# Patient Record
Sex: FEMALE | Race: WHITE
[De-identification: names, ages, dates, MRNs, and addresses within clinical notes are randomized per-mention and may not be internally consistent; named-entity substitution may affect disease eponyms.]

---

## 2017-05-24 ENCOUNTER — HOSPITAL ENCOUNTER (INPATIENT)
Dept: HOSPITAL 80 - F3N | Age: 72
LOS: 2 days | Discharge: HOME HEALTH | DRG: 470 | End: 2017-05-26
Attending: ORTHOPAEDIC SURGERY | Admitting: ORTHOPAEDIC SURGERY
Payer: COMMERCIAL

## 2017-05-24 DIAGNOSIS — E78.5: ICD-10-CM

## 2017-05-24 DIAGNOSIS — E11.9: ICD-10-CM

## 2017-05-24 DIAGNOSIS — M17.12: Primary | ICD-10-CM

## 2017-05-24 DIAGNOSIS — E03.9: ICD-10-CM

## 2017-05-24 DIAGNOSIS — I10: ICD-10-CM

## 2017-05-24 PROCEDURE — C1713 ANCHOR/SCREW BN/BN,TIS/BN: HCPCS

## 2017-05-24 PROCEDURE — 0SRD0J9 REPLACEMENT OF LEFT KNEE JOINT WITH SYNTHETIC SUBSTITUTE, CEMENTED, OPEN APPROACH: ICD-10-PCS | Performed by: ORTHOPAEDIC SURGERY

## 2017-05-24 RX ADMIN — FAMOTIDINE SCH MG: 20 TABLET, FILM COATED ORAL at 19:58

## 2017-05-24 RX ADMIN — ACETAMINOPHEN SCH MG: 325 TABLET ORAL at 17:29

## 2017-05-24 RX ADMIN — PREGABALIN SCH MG: 75 CAPSULE ORAL at 19:58

## 2017-05-24 RX ADMIN — ZOLPIDEM TARTRATE SCH MG: 5 TABLET ORAL at 21:33

## 2017-05-24 RX ADMIN — Medication SCH MLS: at 17:27

## 2017-05-24 RX ADMIN — DOCUSATE SODIUM AND SENNOSIDES SCH TAB: 50; 8.6 TABLET ORAL at 19:58

## 2017-05-24 RX ADMIN — OXYCODONE HYDROCHLORIDE PRN MG: 15 TABLET ORAL at 19:58

## 2017-05-24 RX ADMIN — WARFARIN SODIUM SCH MG: 5 TABLET ORAL at 17:26

## 2017-05-24 RX ADMIN — CYCLOBENZAPRINE HYDROCHLORIDE PRN MG: 10 TABLET, FILM COATED ORAL at 15:10

## 2017-05-24 NOTE — POSTOPPROG
Post Op Note


Date of Operation: 05/24/17


Surgeon: CHRIS Ch


Assistant: maria elena ch


Anesthesiologist: dr. modi


Anesthesia: Spinal, Other (Specify) (adductor canal block)


Pre-op Diagnosis: leftk nee OA


Post-op Diagnosis: same


Indication: left knee pain due to OA that failed conservative measures


Procedure: L TKA 


Findings: severe knee OA


Inf/Abcess present in the surg proc area at time of surgery?: No


EBL:

## 2017-05-25 VITALS — RESPIRATION RATE: 16 BRPM

## 2017-05-25 LAB
HCT VFR BLD CALC: 38.8 % (ref 38–47)
HGB BLD-MCNC: 12.5 G/DL (ref 12.6–16.3)
INR PPP: 1.07 (ref 0.83–1.16)
PROTHROMBIN TIME: 13.8 SEC (ref 12–15)

## 2017-05-25 RX ADMIN — ACETAMINOPHEN SCH MG: 325 TABLET ORAL at 23:01

## 2017-05-25 RX ADMIN — PREGABALIN SCH MG: 75 CAPSULE ORAL at 07:36

## 2017-05-25 RX ADMIN — ACETAMINOPHEN SCH MG: 325 TABLET ORAL at 00:46

## 2017-05-25 RX ADMIN — CYCLOBENZAPRINE HYDROCHLORIDE PRN MG: 10 TABLET, FILM COATED ORAL at 19:27

## 2017-05-25 RX ADMIN — OXYCODONE HYDROCHLORIDE PRN MG: 15 TABLET ORAL at 15:54

## 2017-05-25 RX ADMIN — FAMOTIDINE SCH MG: 20 TABLET, FILM COATED ORAL at 20:08

## 2017-05-25 RX ADMIN — PREGABALIN SCH MG: 75 CAPSULE ORAL at 18:20

## 2017-05-25 RX ADMIN — FAMOTIDINE SCH MG: 20 TABLET, FILM COATED ORAL at 07:35

## 2017-05-25 RX ADMIN — ZOLPIDEM TARTRATE SCH MG: 5 TABLET ORAL at 22:25

## 2017-05-25 RX ADMIN — OXYCODONE HYDROCHLORIDE PRN MG: 15 TABLET ORAL at 20:09

## 2017-05-25 RX ADMIN — Medication SCH MLS: at 00:46

## 2017-05-25 RX ADMIN — ACETAMINOPHEN SCH MG: 325 TABLET ORAL at 11:53

## 2017-05-25 RX ADMIN — CYCLOBENZAPRINE HYDROCHLORIDE PRN MG: 10 TABLET, FILM COATED ORAL at 00:47

## 2017-05-25 RX ADMIN — ACETAMINOPHEN SCH MG: 325 TABLET ORAL at 05:28

## 2017-05-25 RX ADMIN — OXYCODONE HYDROCHLORIDE PRN MG: 15 TABLET ORAL at 05:29

## 2017-05-25 RX ADMIN — ENOXAPARIN SODIUM SCH MG: 100 INJECTION SUBCUTANEOUS at 07:36

## 2017-05-25 RX ADMIN — OXYCODONE HYDROCHLORIDE PRN MG: 15 TABLET ORAL at 11:53

## 2017-05-25 RX ADMIN — WARFARIN SODIUM SCH MG: 5 TABLET ORAL at 15:54

## 2017-05-25 RX ADMIN — DOCUSATE SODIUM AND SENNOSIDES SCH TAB: 50; 8.6 TABLET ORAL at 20:08

## 2017-05-25 RX ADMIN — ACETAMINOPHEN SCH MG: 325 TABLET ORAL at 18:17

## 2017-05-25 RX ADMIN — DOCUSATE SODIUM AND SENNOSIDES SCH TAB: 50; 8.6 TABLET ORAL at 07:34

## 2017-05-25 RX ADMIN — LEVOTHYROXINE SODIUM SCH MCG: 0.09 TABLET ORAL at 05:28

## 2017-05-25 NOTE — GDS
[f rep st]



                                                             DISCHARGE SUMMARY





ADMISSION DIAGNOSIS:  Left knee osteoarthritis.



DISCHARGE DIAGNOSIS:  Left knee osteoarthritis.



PROCEDURE:  Left total knee arthroplasty.



VTE PROPHYLAXIS:  Coumadin and Lovenox recommended.



BRIEF DESCRIPTION OF HOSPITAL STAY:  Patient was admitted for an elective joint arthroplasty.  The p
atient tolerated the procedure well and has passed physical therapy.  The patient was given appropri
ate antibiotic prophylaxis and venous thromboembolism prophylaxis.  The patient's pain was well cont
rolled on oral pain medication, patient was holding down food, and had urinated.  Decision was made 
to discharge the patient.  The patient was given post-operative prescriptions pre-operatively.



PLAN:  Please follow up as scheduled, June 8, 2017, at 11:00 a.m.





Job #:  [id number]

## 2017-05-25 NOTE — SOAPPROG
SOAP Progress Note


Assessment/Plan: 


Assessment:








Patient is doing well POD 1 s/p L TKA


Pain management: pain is well controlled on oral pain meds.


VTE ppx: recommend coumadin and lovenox, cont JASPER and SCDs


Anemia: level is expected initially postop. Asymptomatic. Continue to monitor 


D/c planning: d/c to home tomorrow pending release from PT with support of HH 

RN and PT for blood draws 

















Plan:





05/25/17 13:30





Subjective: 





Niki is doing well today, denies SOB, chest pain and N/V.  pleased with 

recovery so far


Objective: 





 Vital Signs











Temp Pulse Resp BP Pulse Ox


 


 36.5 C   64   16   111/71   94 


 


 05/25/17 13:09  05/25/17 13:09  05/25/17 13:09  05/25/17 13:09  05/25/17 13:09








 Laboratory Results





 05/25/17 04:25 





 











 05/24/17 05/25/17 05/26/17





 05:59 05:59 05:59


 


Intake Total  2650 100


 


Output Total  1430 750


 


Balance  1220 -650








 











PT  13.8 SEC (12.0-15.0)   05/25/17  04:25    


 


INR  1.07  (0.83-1.16)   05/25/17  04:25    








LLE: incision dressing is clean and dry, NVI, +pf/df





ICD10 Worksheet


Patient Problems: 


 Problems











Problem Status Onset


 


Primary localized osteoarthritis of left knee Acute

## 2017-05-25 NOTE — GOP
[f 
rep st]



                                                                OPERATIVE REPORT





DATE OF OPERATION:  5/24/17



SURGEON:  BREEZY Mcdaniel MD



ASSISTANT:  CINTHIA Irby



ANESTHESIA:  Spinal.



PREOPERATIVE DIAGNOSIS:  Left knee osteoarthritis.



POSTOPERATIVE DIAGNOSIS:  Left knee osteoarthritis.



PROCEDURE PERFORMED:  Left total knee arthroplasty.



FINDINGS:  



ESTIMATED BLOOD LOSS:  30 cc.



INDICATIONS:  This is a 71-year-old female with severe and progressive pain and 
deformity of the left knee unresponsive to conservative care.  Risks and 
benefits of the surgical intervention were explained in detail.



DESCRIPTION OF PROCEDURE:  The patient was brought to the operative room and 
placed on the table in the supine position.  Spinal anesthesia was induced 
without difficulty.  A pneumatic tourniquet was applied about the left proximal 
thigh, and the leg was prepped and draped in a sterile fashion.  The leg guzman 
was applied.  After exsanguination by elevation the tourniquet was inflated to 
275 mm of mercury. 



Incision was made anterior medial from the tibial tuberosity to a point 2 cm 
proximal to the superior pole of the patella.  Medial parapatellar arthrotomy 
was carried out from the superior pole of the patella and posteriorly in line 
with the fibers of the Type 2 VMO.  Pathology  severe medial patellofemoral 
osteoarthritis.  The medial collateral ligament was elevated and the 
infrapatellar fat pad was resected. 



The patella was everted and the articular surface was excised.  A 35 mm 
patellar button was placed. The distal femoral guide hole was drilled and the 6-
degree alignment geena was placed.  An 8 mm distal femoral cut was made without 
difficulty. 



Attention was turned to the tibia and a standard 9 mm cut based on the lateral 
tibial condyle was performed.  The tibial articular surface was excised without 
difficulty. 



Attention was turned back to the femur and a size 4 Triathlon femoral cutting 
block was positioned.  Anterior, posterior, and chamfer cuts were made, 
followed by the intercondylar box cut. 



The knee was extended and the remnants of the medial and lateral meniscus were 
excised.  The posterior capsule was injected with ropivacaine, epinephrine and 
Toradol.  A size 3 MIS mini-keel tibial tray was positioned.  Trial reduction 
was then carried out.  There was excellent range of motion, alignment, and 
stability using the 9 mm polyethylene. 



All trials were then removed.  The joint was thoroughly irrigated and carefully 
dried.  Two packages of cement and 2 grams of vancomycin were mixed in the 
vacuum mixer and placed on the fixation surfaces of all surfaces of the 
components.  The components were implanted and all excess cement was thoroughly 
removed.  The permanent 9 mm polyethylene was placed without difficulty.  



The tourniquet was deflated and all bleeders were coagulated.  The wound was 
thoroughly irrigated and closed using interrupted sutures of 2-0 Vicryl for the 
joint capsule.  The subcu was closed with 3-0 Vicryl and the skin with 4-0 
Monocryl.  Dermabond and Steri-Strips were applied followed by a compressive 
dressing.  The patient was then moved from the operating room to the recovery 
room in good condition, having tolerated the procedure well.





Job #:  [id number]

MTDD

## 2017-05-26 VITALS — TEMPERATURE: 97.8 F | SYSTOLIC BLOOD PRESSURE: 113 MMHG | DIASTOLIC BLOOD PRESSURE: 81 MMHG | HEART RATE: 73 BPM

## 2017-05-26 VITALS — OXYGEN SATURATION: 79 %

## 2017-05-26 LAB
HCT VFR BLD CALC: 39.2 % (ref 38–47)
HGB BLD-MCNC: 12.6 G/DL (ref 12.6–16.3)
INR PPP: 1.08 (ref 0.83–1.16)
PROTHROMBIN TIME: 13.9 SEC (ref 12–15)

## 2017-05-26 RX ADMIN — OXYCODONE HYDROCHLORIDE PRN MG: 15 TABLET ORAL at 00:00

## 2017-05-26 RX ADMIN — ENOXAPARIN SODIUM SCH MG: 100 INJECTION SUBCUTANEOUS at 08:07

## 2017-05-26 RX ADMIN — LEVOTHYROXINE SODIUM SCH MCG: 0.09 TABLET ORAL at 05:06

## 2017-05-26 RX ADMIN — FAMOTIDINE SCH MG: 20 TABLET, FILM COATED ORAL at 08:08

## 2017-05-26 RX ADMIN — OXYCODONE HYDROCHLORIDE PRN MG: 15 TABLET ORAL at 05:05

## 2017-05-26 RX ADMIN — OXYCODONE HYDROCHLORIDE PRN MG: 15 TABLET ORAL at 12:25

## 2017-05-26 RX ADMIN — PREGABALIN SCH MG: 75 CAPSULE ORAL at 08:07

## 2017-05-26 RX ADMIN — WARFARIN SODIUM SCH MG: 5 TABLET ORAL at 15:08

## 2017-05-26 RX ADMIN — DOCUSATE SODIUM AND SENNOSIDES SCH TAB: 50; 8.6 TABLET ORAL at 08:08

## 2017-05-26 RX ADMIN — ACETAMINOPHEN SCH MG: 325 TABLET ORAL at 05:05

## 2017-05-26 RX ADMIN — ACETAMINOPHEN SCH MG: 325 TABLET ORAL at 12:26

## 2017-05-26 NOTE — PDIAF
- Diagnosis


Diagnosis: s/p L TKA


Code Status: Full Code





- Medication Management


Discharge Medications: 


 Medications to Continue on Transfer





SUMAtriptan [Imitrex 50 MG (*)] 100 mg PO PRN PRN 07/19/13 [Last Taken 05/20/17]


Zolpidem Tartrate [Ambien 5MG (*)] 10 mg PO HS 07/19/13 [Last Taken 05/23/17]


metFORMIN HCL [Glucophage 500 mg (*)] 500 mg PO BIDMEAL 07/19/13 [Last Taken 05/ 24/17]


Omega-3 Fatty Acids [Fish Oil 1000 mg (*)] 1,000 mg PO DAILY 07/23/13 [Last 

Taken 05/10/17]


Ergocalciferol [Vitamin D2 (*)] 50,000 unit PO SA 04/17/17 [Last Taken 05/10/17]


Herbals/Supplements -Info Only 1 ea PO DAILY 04/17/17 [Last Taken 05/10/17]


Levothyroxine [Synthroid 88 mcg (*)] 88 mcg PO DAILY06 04/17/17 [Last Taken 05/ 24/17]


Pregabalin [Lyrica 75mg (*)] 150 mg PO BID 04/17/17 [Last Taken 05/24/17]


Promethazine HCl [Phenergan 25mg (*)] 25 mg PO DAILY PRN 04/17/17 [Last Taken 

Unknown]


buPROPion XL [Wellbutrin 150mg XL] 300 mg PO DAILY 04/17/17 [Last Taken 05/24/17

]


traMADol [Ultram 50 mg (*)] 100 mg PO TID 04/17/17 [Last Taken 05/23/17]


Acetaminophen [Tylenol 325mg (*)] 650 mg PO Q6HRS #0 tab 05/25/17 [Last Taken 

Unknown]


Cyclobenzaprine [Flexeril 10 MG (*)] 10 mg PO Q8HRS PRN #0 tab 05/25/17 [Last 

Taken Unknown]


Enoxaparin [Lovenox 40 MG (*)] 40 mg SC DAILY #0 syr 05/25/17 [Last Taken 

Unknown]


Sennosides/Docusate Sodium [Senokot-S] 1 - 2 tab PO BID #0 tab 05/25/17 [Last 

Taken Unknown]


Warfarin Sodium [Coumadin 5MG (*)] 5 mg PO DAILY AT 4PM #0 tab 05/25/17 [Last 

Taken Unknown]


celeCOXIB [Celebrex (*)] 200 mg PO DAILY #0 cap 05/25/17 [Last Taken Unknown]


oxyCODONE IR [Oxycodone Ir (*)] 5 - 10 mg PO Q3HRS PRN #0 tab 05/25/17 [Last 

Taken Unknown]








Discharge Medications: Refer to the Discharge Home Medication list for PRN 

reason.





- Orders


Services needed: Home Care, Registered Nurse, Physical Therapy


Home Care Face to Face: I certify that this patient was under my care and that 

I had the required face-to-face encounter meeting the encounter requirements on 

the discharge day.  My findings support the fact that the patient is homebound 

as defined in CMS Chapter 7 Medicare Benefits Manual 30.1.1, The condition of 

the patient is such that there exists a normal inability to leave home and 

consequently, leaving home would require a considerable and taxing effort.


Oxygen: on oxygen 24H until follow up with PCP


Diet Recommendation: no restrictions on diet


Diet Texture: Regular Texture Diet


Michael Stockings Discontinue Date: daytime x 2 weeks, off at night


Wound Care Instructions: remove dressing in 2 weeks, cover for showers


Activity/Weight Bearing Restrictions: WBAT





- Labs/Radiology


PT/INR Date: 05/30/17 (every Monday and thursday morning x 3 weeks. Call 

markus with results at [facility] [place] [phone])





- Follow Up Care


Current Providers and Referrals: 


CHRIS Mcdaniel MD [Medical Doctor] - 06/08/17 11:00 am


Joe Saleh MD [Primary Care Provider] -

## 2017-05-26 NOTE — PDFACE2FAC
Face to Face Encounter


1.  I certify that this patient is under my care and that I, or a nurse 

practitioner or physician's assistant working with me, had a face-to-face 

encounter that meets the physician face-to-face encounter requirements with 

this patient on 05/26/17.








2. I certify that based on my findings, the following services are medically 

necessary home health services:





        [X Nursing] 


        [X Physical Therapy] 


        [ Speech-Language Pathology]








3. The medical condition and clinical findings that support the need for 

specialized 


   skills, knowledge and judgement of the above services are:





        [s/p TKA needs assistance, cannot drive, on narcotics, needs labs drawn 

and PT twice weekly]





4. I certify this patient is homebound* because [the patient's condition 

restricts their ability to leave their home except with the assistance of 

another individual or the aid of a supportive device.]


    


    


must use FWW, cannot drive on narcotics


__________________________________________________________________





I certify that this patient is confined to his/her home and needs intermittent 

skilled nursing care, physical and/or speech therapy. This patient is under my 

care and I have authorized home health services.











* Homebound is defined by Medicare as follows: absences from home require 


   considerable and tacking effort and or for medical reasons or [employer] 

services or are 


   infrequent or of short duration when for other reasons*.

## 2017-05-26 NOTE — SOAPPROG
SOAP Progress Note


Assessment/Plan: 


Assessment:








Patient is doing well POD 2 s/p L TKA


Pain management: pain is well controlled on oral pain meds.


VTE ppx: recommend coumadin and lovenox, cont JASPER and SCDs


Anemia: level is expected initially postop. Asymptomatic. Continue to monitor 


D/c planning: d/c to home tomorrow pending release from PT with support of HH 

RN and PT for blood draws


oxygen sat: recommend home O2 

















Plan:





05/25/17 13:30





05/26/17 14:49





Subjective: 





Niki is doing well.  Denies SOB, chest pain and N/V.


Objective: 





 Vital Signs











Temp Pulse Resp BP Pulse Ox


 


 36.6 C   73   16   113/81 H  79 L


 


 05/26/17 07:21  05/26/17 07:21  05/26/17 07:21  05/26/17 07:21  05/26/17 09:55








 Laboratory Results





 05/26/17 04:20 





 











 05/25/17 05/26/17 05/27/17





 05:59 05:59 05:59


 


Intake Total 2650 1100 


 


Output Total 1430 1900 600


 


Balance 1220 -800 -600








 











PT  13.9 SEC (12.0-15.0)   05/26/17  04:20    


 


INR  1.08  (0.83-1.16)   05/26/17  04:20    








LLE; incision dressing is clean and dry





ICD10 Worksheet


Patient Problems: 


 Problems











Problem Status Onset


 


Primary localized osteoarthritis of left knee Acute

## 2017-09-12 ENCOUNTER — HOSPITAL ENCOUNTER (OUTPATIENT)
Dept: HOSPITAL 80 - FIMAGING | Age: 72
End: 2017-09-12
Attending: ORTHOPAEDIC SURGERY
Payer: COMMERCIAL

## 2017-09-12 DIAGNOSIS — M79.661: Primary | ICD-10-CM

## 2018-11-14 ENCOUNTER — HOSPITAL ENCOUNTER (INPATIENT)
Dept: HOSPITAL 80 - F3E | Age: 73
LOS: 23 days | Discharge: NURSING, REHAB OR LONG-TERM CARE | DRG: 468 | End: 2018-12-07
Attending: ORTHOPAEDIC SURGERY | Admitting: ORTHOPAEDIC SURGERY
Payer: COMMERCIAL

## 2018-11-14 DIAGNOSIS — F32.9: ICD-10-CM

## 2018-11-14 DIAGNOSIS — G89.4: ICD-10-CM

## 2018-11-14 DIAGNOSIS — M81.0: ICD-10-CM

## 2018-11-14 DIAGNOSIS — E11.9: ICD-10-CM

## 2018-11-14 DIAGNOSIS — J45.998: ICD-10-CM

## 2018-11-14 DIAGNOSIS — I10: ICD-10-CM

## 2018-11-14 DIAGNOSIS — T84.092A: Primary | ICD-10-CM

## 2018-11-14 DIAGNOSIS — E78.5: ICD-10-CM

## 2018-11-14 PROCEDURE — C1713 ANCHOR/SCREW BN/BN,TIS/BN: HCPCS

## 2018-12-05 PROCEDURE — 0SPV0JZ REMOVAL OF SYNTHETIC SUBSTITUTE FROM RIGHT KNEE JOINT, TIBIAL SURFACE, OPEN APPROACH: ICD-10-PCS | Performed by: ORTHOPAEDIC SURGERY

## 2018-12-05 PROCEDURE — 0SRV0J9 REPLACEMENT OF RIGHT KNEE JOINT, TIBIAL SURFACE WITH SYNTHETIC SUBSTITUTE, CEMENTED, OPEN APPROACH: ICD-10-PCS | Performed by: ORTHOPAEDIC SURGERY

## 2018-12-05 RX ADMIN — ATORVASTATIN CALCIUM SCH MG: 40 TABLET, FILM COATED ORAL at 18:26

## 2018-12-05 RX ADMIN — GABAPENTIN SCH: 300 CAPSULE ORAL at 18:20

## 2018-12-05 RX ADMIN — OXYCODONE HYDROCHLORIDE PRN MG: 15 TABLET ORAL at 23:17

## 2018-12-05 RX ADMIN — FAMOTIDINE SCH MG: 20 TABLET, FILM COATED ORAL at 21:40

## 2018-12-05 RX ADMIN — DOCUSATE SODIUM AND SENNOSIDES SCH: 50; 8.6 TABLET ORAL at 21:40

## 2018-12-05 RX ADMIN — Medication SCH MLS: at 21:39

## 2018-12-05 RX ADMIN — ASPIRIN 81 MG SCH MG: 81 TABLET ORAL at 21:39

## 2018-12-05 RX ADMIN — GABAPENTIN SCH: 300 CAPSULE ORAL at 18:28

## 2018-12-05 RX ADMIN — INSULIN HUMAN SCH UNITS: 100 INJECTION, SOLUTION PARENTERAL at 18:45

## 2018-12-05 RX ADMIN — GABAPENTIN SCH MG: 300 CAPSULE ORAL at 21:39

## 2018-12-05 RX ADMIN — ACETAMINOPHEN SCH MG: 325 TABLET ORAL at 18:26

## 2018-12-05 RX ADMIN — INSULIN HUMAN SCH UNITS: 100 INJECTION, SOLUTION PARENTERAL at 21:53

## 2018-12-05 RX ADMIN — CYCLOSPORINE SCH: 0.5 EMULSION OPHTHALMIC at 21:41

## 2018-12-05 RX ADMIN — ACETAMINOPHEN SCH MG: 325 TABLET ORAL at 23:17

## 2018-12-05 NOTE — POSTANESTH
Post Anesthetic Evaluation


Cardiovascular Status: Normal, Stable, Similar to Pre-Op Cond


Respiratory Status: Normal, Stable, Similar to Pre-op Cond.


Level of Consciousness/Mental Status: Can Participate in Eval, Alert and 

Oriented


Pain Control: Adequate, Prn Tx Ordered (Adductor canal nerve block in PACU.  U/

S used to inject 20 ml 0.25% bupiv + epi incrementally.  No evident 

complications.)


Nausea/Vomiting Control: Adequate, Prn Tx Ordered


Complications Possibly Related to Anesthesia: None Noted

## 2018-12-05 NOTE — PDMN
Medical Necessity


Medical necessity: Pt meets inpt criteria per MD order and MCG S-700, Knee 

Arthroplasty, Total, Revision. 74 y/o w/R knee failed TKA, admitted for R knee 

revision, tibial component.
I will STOP taking the medications listed below when I get home from the hospital:  None

## 2018-12-05 NOTE — POSTOPPROG
Post Op Note


Date of Operation: 12/05/18


Surgeon: CHRIS Mcdaniel


Assistant: Zak Singh MD, Svetlana PINEDA


Anesthesiologist: Summer


Anesthesia: Spinal


Pre-op Diagnosis: R knee Failed TKA


Post-op Diagnosis: same


Indication: pain, instability


Procedure: R knee rev Tibial component


Findings: loose tibial component


Inf/Abcess present in the surg proc area at time of surgery?: No


EBL:

## 2018-12-05 NOTE — PDANEPAE
ANE Past Medical History





- Cardiovascular History


Hx Hypertension: Yes


Hx Arrhythmias: No


Hx Chest Pain: No


Hx Coronary Artery / Peripheral Vascular Disease: No


Hx CHF / Valvular Disease: No


Hx Palpitations: No


Cardiovascular History Comment: DYSLIPIDEMIA.  HX OF HTN NOT CURRENTLY.  

FOLLOWED BY Merged with [facility] FOR ABN ECGs





- Pulmonary History


Hx COPD: No


Hx Asthma/Reactive Airway Disease: No


Hx Recent Upper Respiratory Infection: No


Hx Oxygen in Use at Home: No


Hx Sleep Apnea: No


Sleep Apnea Screening Result - Last Documented: Negative


Pulmonary History Comment: SEASONAL ASTHMA





- Neurologic History


Hx Cerebrovascular Accident: No


Hx Seizures: No


Hx Dementia: No


Neurologic History Comment: MIGRAINES





- Endocrine History


Hx Diabetes: Yes


Obesity: moderate


Endocrine History Comment: DM II.  HX OF THYROIDECTOMY





- Renal History


Hx Renal Disorders: No





- Liver History


Hx Hepatic Disorders: No





- Neurological & Psychiatric Hx


Hx Neurological and Psychiatric Disorders: Yes


Neurological / Psychiatric History Comment: INSOMNIA.  DEPRESSION





- Cancer History


Hx Cancer: Yes


Cancer History Comment: SQUAMOUS CELL REMOVED





- Congenital Disorder History


Hx Congenital Disorders: No





- GI History


Hx Gastrointestinal Disorders: Yes


Gastrointestinal History Comment: REFLUX





- Other Health History


Other Health History: WEARS GLASSES.  FIBROMYALGIA.  OSTEOPENIA





- Chronic Pain History


Chronic Pain: Yes (CHRONIC PAIN ISSUES)





- Surgical History


Prior Surgeries: LEFT TKA WITH DR VELEZ 05/24/17.  R TKA X2.  THYROIDECTOMY 

WITH JERRY 07/26/13.  HERNIA REPAIR.  ANKLE SURG.  FILOMENA THUMBS.  SHOULDER 

REPAIR.  R FOOT BONE SPUR





ANE Review of Systems


Review of Systems: 








- Exercise capacity


METS (RN): 4 METS





ANE Patient History





- Allergies


Allergies/Adverse Reactions: 








adhesive Allergy (Verified 11/12/18 12:48)


 Rash


codeine [Codeine] Allergy (Verified 11/12/18 12:48)


 MAKES ME VERY NAUSEATED


ALL OPIATES Allergy (Uncoded 11/12/18 12:48)


 UPSET STOMACH/NAUSEA








- Home Medications


Home medications: home medication list seen and reviewed


Home Medications: 








SUMAtriptan [Imitrex 50 MG (*)] 100 mg PO PRN PRN 07/19/13 [Last Taken 11/27/18]


Zolpidem Tartrate [Ambien 5MG (*)] 10 mg PO HS 07/19/13 [Last Taken 12/04/18 20:

00]


metFORMIN HCL [Glucophage 500 mg (*)] 500 mg PO BIDMEAL 07/19/13 [Last Taken 12/ 04/18 08:00]


Omega-3 Fatty Acids [Fish Oil 1000 mg (*)] 1,000 mg PO DAILY 07/23/13 [Last 

Taken 11/27/18]


Ergocalciferol [Vitamin D2 (*)] 50,000 unit PO SA 04/17/17 [Last Taken 11/27/18]


Levothyroxine [Synthroid 88 mcg (*)] 88 mcg PO DAILY06 04/17/17 [Last Taken 12/ 04/18 18:00]


Promethazine HCl [Phenergan 25mg (*)] 25 mg PO DAILY PRN 04/17/17 [Last Taken 

Unknown]


buPROPion XL [Wellbutrin 150mg XL] 300 mg PO DAILY 04/17/17 [Last Taken 12/05/ 18 08:00]


traMADol [Ultram 50 mg (*)] 100 mg PO TID 04/17/17 [Last Taken 12/05/18 08:00]


Advair Hfa 230/21 1 - 2 puffs IH BID 11/09/18 [Last Taken 12/04/18 16:00]


Atorvastatin Calcium [Lipitor 40 mg (*)] 40 mg PO DAILY@1800 11/09/18 [Last 

Taken 12/04/18 18:00]


Gabapentin 600 mg PO TID 11/09/18 [Last Taken 12/05/18 08:00]


Meloxicam [Meloxicam] 15 mg PO DAILY 11/09/18 [Last Taken 11/06/18]


OLANZapine [ZyPREXA 2.5 mg (*)] 2.5 mg PO HS PRN 11/09/18 [Last Taken 12/04/18 

20:00]


Tizanidine HCl [Tizanidine HCl] 4 mg PO Q4 PRN 11/09/18 [Last Taken 12/04/18 18:

00]


cycloSPORINE 0.05% [Restasis Opht Drops(*)] 1 drop EACHEYE BID 11/09/18 [Last 

Taken 12/05/18 08:00]








- NPO status


NPO Status: no food or drink >8 hours


NPO Since - Liquids (Date): 12/04/18


NPO Since - Liquids (Time): 20:00


NPO Since - Solids (Date): 12/05/18


NPO Since - Solids (Time): 10:00





- Anes Hx


Anes Hx: no prior problems (Reports 2 days of amnesia following last TKA.)





- Smoking Hx


Smoking Status: Never smoked





- Family Anes Hx


Family Hx Anesthesia Complications: ADOPTED





ANE Labs/Vital Signs





- Labs


Result Diagrams: 


 12/05/18 12:10





- Vital Signs


Blood Pressure: 138/88


Heart Rate: 64


Respiratory Rate: 16


O2 Sat (%): 93


Height: 161 cm


Weight: 85.4 kg





ANE Physical Exam





- Airway


Neck exam: FROM


Mallampati Score: Class 2


Mouth exam: normal dental/mouth exam





- Pulmonary


Pulmonary: no respiratory distress, no rales or rhonchi, clear to auscultation





- Cardiovascular


Cardiovascular: regular rate and rhythym, no murmur, rub, or gallop





- ASA Status


ASA Status: II





ANE Anesthesia Plan


Anesthesia Plan: spinal


Regional Anesthesia: adductor canal FNB

## 2018-12-06 RX ADMIN — OXYCODONE HYDROCHLORIDE PRN MG: 15 TABLET ORAL at 18:25

## 2018-12-06 RX ADMIN — DOCUSATE SODIUM AND SENNOSIDES SCH: 50; 8.6 TABLET ORAL at 21:26

## 2018-12-06 RX ADMIN — OXYCODONE HYDROCHLORIDE PRN MG: 15 TABLET ORAL at 15:32

## 2018-12-06 RX ADMIN — INSULIN HUMAN SCH: 100 INJECTION, SOLUTION PARENTERAL at 18:24

## 2018-12-06 RX ADMIN — INSULIN HUMAN SCH: 100 INJECTION, SOLUTION PARENTERAL at 21:26

## 2018-12-06 RX ADMIN — INSULIN HUMAN SCH: 100 INJECTION, SOLUTION PARENTERAL at 08:11

## 2018-12-06 RX ADMIN — GABAPENTIN SCH MG: 300 CAPSULE ORAL at 21:27

## 2018-12-06 RX ADMIN — ATORVASTATIN CALCIUM SCH MG: 40 TABLET, FILM COATED ORAL at 18:25

## 2018-12-06 RX ADMIN — FAMOTIDINE SCH MG: 20 TABLET, FILM COATED ORAL at 08:39

## 2018-12-06 RX ADMIN — ACETAMINOPHEN SCH MG: 325 TABLET ORAL at 05:54

## 2018-12-06 RX ADMIN — LEVOTHYROXINE SODIUM SCH MCG: 0.09 TABLET ORAL at 05:54

## 2018-12-06 RX ADMIN — GABAPENTIN SCH MG: 300 CAPSULE ORAL at 15:32

## 2018-12-06 RX ADMIN — FAMOTIDINE SCH MG: 20 TABLET, FILM COATED ORAL at 21:27

## 2018-12-06 RX ADMIN — DOCUSATE SODIUM AND SENNOSIDES SCH: 50; 8.6 TABLET ORAL at 08:40

## 2018-12-06 RX ADMIN — GABAPENTIN SCH MG: 300 CAPSULE ORAL at 08:39

## 2018-12-06 RX ADMIN — ACETAMINOPHEN SCH MG: 325 TABLET ORAL at 23:09

## 2018-12-06 RX ADMIN — CYCLOBENZAPRINE HYDROCHLORIDE PRN MG: 10 TABLET, FILM COATED ORAL at 16:46

## 2018-12-06 RX ADMIN — ZOLPIDEM TARTRATE PRN MG: 5 TABLET ORAL at 01:12

## 2018-12-06 RX ADMIN — ASPIRIN 81 MG SCH MG: 81 TABLET ORAL at 21:27

## 2018-12-06 RX ADMIN — ACETAMINOPHEN SCH MG: 325 TABLET ORAL at 18:25

## 2018-12-06 RX ADMIN — CYCLOSPORINE SCH: 0.5 EMULSION OPHTHALMIC at 08:43

## 2018-12-06 RX ADMIN — CYCLOBENZAPRINE HYDROCHLORIDE PRN MG: 10 TABLET, FILM COATED ORAL at 03:39

## 2018-12-06 RX ADMIN — ASPIRIN 81 MG SCH MG: 81 TABLET ORAL at 08:40

## 2018-12-06 RX ADMIN — INSULIN HUMAN SCH: 100 INJECTION, SOLUTION PARENTERAL at 12:49

## 2018-12-06 RX ADMIN — ACETAMINOPHEN SCH MG: 325 TABLET ORAL at 11:35

## 2018-12-06 RX ADMIN — OXYCODONE HYDROCHLORIDE PRN MG: 15 TABLET ORAL at 08:40

## 2018-12-06 RX ADMIN — Medication SCH MLS: at 05:54

## 2018-12-06 RX ADMIN — CYCLOSPORINE SCH: 0.5 EMULSION OPHTHALMIC at 21:26

## 2018-12-06 RX ADMIN — OXYCODONE HYDROCHLORIDE PRN MG: 15 TABLET ORAL at 03:39

## 2018-12-06 RX ADMIN — ZOLPIDEM TARTRATE PRN MG: 5 TABLET ORAL at 23:09

## 2018-12-06 NOTE — SOAPPROG
SOAP Progress Note


Assessment/Plan: 


Assessment:





Patient is doing well POD 1 s/p R TKA, tibial component revision 


Pain management: pain is well controlled on oral pain meds.


VTE ppx: recommend aspirin 81 mg BID for 4 weeks, cont JASPER and SCDs


D/c planning: recommend SNF placement as we have recommended that patient due 

FWW for 3 weeks postop and for postoperative care as patient lives alone. 

Appreciate case management's assistance in arranging SNF placement either today 

or tomorrow, left timing up to patient and availability of SNF placement.  

patient states she did not get great rest at the hospital




















Plan:





12/06/18 08:53





12/06/18 08:56





Subjective: 





ryan is doing well, denies SOB, chest pain and n/v states she did not get much 

sleep last night


Objective: 





 Vital Signs











Temp Pulse Resp BP Pulse Ox


 


 36.4 C   57 L  16   145/82 H  97 


 


 12/06/18 07:49  12/06/18 07:49  12/06/18 07:49  12/06/18 07:49  12/06/18 07:49








 Microbiology











 12/05/18 14:06 Gram Stain - Final





 Knee - Eswab 








 Laboratory Results





 12/06/18 03:20 





 12/06/18 03:20 





 











 12/05/18 12/06/18 12/07/18





 05:59 05:59 05:59


 


Intake Total  2075 


 


Output Total  1375 


 


Balance  700 








RLE: incision dressing is clean and dry, NVI, +pf/df





ICD10 Worksheet


Patient Problems: 


 Problems











Problem Status Onset


 


Failed total right knee replacement Acute  


 


History of revision of total knee arthroplasty Acute  


 


Primary localized osteoarthritis of left knee Acute

## 2018-12-06 NOTE — ASMTCMCOM
CM Note

 

CM Note                       

Notes:

Pt had planned TKA, resides alone. PT rec SNF. Pt accepted at [facility] and can accept pt 

today or tomorrow, pt prefers tomorrow. CM to follow.



D/c plan of care: [facility] 

 

Date Signed:  12/06/2018 01:52 PM

Electronically Signed By:GAYATRI Montelongo

## 2018-12-06 NOTE — GOP
DATE OF OPERATION:  12/05/2018



SURGEON:  BREEZY Mcdaniel MD



ASSISTANT:  Mario Singh MD and CINTHIA Irby.



PREOPERATIVE DIAGNOSIS:  Right knee failed total knee arthroplasty.



POSTOPERATIVE DIAGNOSIS:  Right knee failed total knee arthroplasty.



PROCEDURE PERFORMED:  Right knee tibial component revision.



FINDINGS:  





ESTIMATED BLOOD LOSS:  50 cc.



INDICATIONS:  The patient is a 73-year-old female who had a failed right total knee arthroplasty.  Di
scussed the risks and benefits __________ with the patient.  She was encouraged to undergo revision a
rthroplasty.  Informed consent was obtained.



DESCRIPTION OF PROCEDURE:  The patient was identified in the preoperative holding area.  Her right lo
wer extremity was marked.  She was then brought back to the operating room.  After induction of anest
hesia, she was placed supine on the table with a nonsterile tourniquet on her right  upper thigh.  Sh
e was then prepped and draped in the usual sterile fashion.  A time-out was taken for patient lateral
ity, procedure, allergies, and antibiotic status.  We then proceeded to make an incision through her 
prior incision, after elevating the tourniquet.  The tourniquet was up for a total of 95 minutes at 2
50.  We did a medial parapatellar portal.  We sent a swab of the fluid for Gram stain and culture.  W
e did an injection along the arthrotomy line.  Noted some scar tissue and inflamed tissue.  This was 
removed.  We were able to see the tibia was grossly loose.  The femur was well fixed.  We removed the
 tibial polyethylene and the tibia component with little difficulty, removing any excess cement.  We 
used hand reamers to enter into the distal canal.  We then planned for cone, reamed up to a size C, p
laced our trial cone in place with trial size 2 tibial implant with 10 mm wedges.  This was taken thr
ough a range of motion and found to be stable with a 15 mm poly.  The patient had intact collateral l
igaments.  We then removed the trial components.  The incision was copiously irrigated.  Tranexamic a
jamir was applied.  We then placed our press-fit cone size C on the metaphyseal region.  We then cement
ed through the cone, the final implant, which was a [employer] 100 mm size 2 wedge step x2 with a 7
5 mm x 10 mm fluted stem.  A __________ 17.5 mm thickness was placed.  This gave excellent stability 
and range of motion.  The incision was copiously irrigated, and the incision was closed in layers.  T
he patient was placed in a sterile dressing and brought to PACU in good condition with a well-perfuse
d limb.  



The plan for the next week will be weightbearing as tolerated and use a walker.





Job #:  [id number]

## 2018-12-07 VITALS — DIASTOLIC BLOOD PRESSURE: 58 MMHG | SYSTOLIC BLOOD PRESSURE: 106 MMHG

## 2018-12-07 RX ADMIN — GABAPENTIN SCH MG: 300 CAPSULE ORAL at 08:22

## 2018-12-07 RX ADMIN — INSULIN HUMAN SCH: 100 INJECTION, SOLUTION PARENTERAL at 08:19

## 2018-12-07 RX ADMIN — ASPIRIN 81 MG SCH MG: 81 TABLET ORAL at 08:24

## 2018-12-07 RX ADMIN — ACETAMINOPHEN SCH MG: 325 TABLET ORAL at 14:03

## 2018-12-07 RX ADMIN — OXYCODONE HYDROCHLORIDE PRN MG: 15 TABLET ORAL at 05:08

## 2018-12-07 RX ADMIN — CYCLOBENZAPRINE HYDROCHLORIDE PRN MG: 10 TABLET, FILM COATED ORAL at 05:08

## 2018-12-07 RX ADMIN — LEVOTHYROXINE SODIUM SCH MCG: 0.09 TABLET ORAL at 05:08

## 2018-12-07 RX ADMIN — CYCLOSPORINE SCH: 0.5 EMULSION OPHTHALMIC at 09:18

## 2018-12-07 RX ADMIN — INSULIN HUMAN SCH: 100 INJECTION, SOLUTION PARENTERAL at 11:33

## 2018-12-07 RX ADMIN — DOCUSATE SODIUM AND SENNOSIDES SCH: 50; 8.6 TABLET ORAL at 08:23

## 2018-12-07 RX ADMIN — OXYCODONE HYDROCHLORIDE PRN MG: 15 TABLET ORAL at 08:23

## 2018-12-07 RX ADMIN — FAMOTIDINE SCH MG: 20 TABLET, FILM COATED ORAL at 08:24

## 2018-12-07 RX ADMIN — ACETAMINOPHEN SCH MG: 325 TABLET ORAL at 05:08

## 2018-12-07 NOTE — ASMTLACE
LACE

 

Length of stay for            Answers:  3 days                                

current admission                                                             

Acuity / Level of             Answers:  Yes                                   

Care: Did the patient                                                         

have an inpatient                                                             

admission?                                                                    

Comorbidities - select        Answers:  Diabetes (uncontrolled or             

all that apply                          controlled)                           

                                        Opioid dependence                     

                                        / Chronic pain                        

                                        Other                         Notes:  HTN

# of Emergency department     Answers:  0                                     

visits in the last 6                                                          

months                                                                        

Social determinants           Answers:  Mental health diagnosis               

                                        (anxiety, depression, pers            

                                        onality disorders, etc.)              

Score: 15

 

Date Signed:  12/07/2018 03:20 PM

Electronically Signed By:GAYATRI Montelongo

## 2018-12-07 NOTE — SOAPPROG
SOAP Progress Note


Assessment/Plan: 


Assessment:





Patient is doing well POD 2 s/p R TKA, tibial component revision 


Pain management: pain is well controlled on oral pain meds.


VTE ppx: recommend aspirin 81 mg BID for 4 weeks, cont JASPER and SCDs


D/c planning: recommend SNF placement today and FWW for 3 weeks




















Plan:





12/06/18 08:53





12/06/18 08:56





12/07/18 13:46





Subjective: 





doing well, pain well controlled 


Objective: 





 Vital Signs











Temp Pulse Resp BP Pulse Ox


 


 36.4 C   58 L  14   106/58 L  91 L


 


 12/07/18 07:51  12/07/18 07:51  12/07/18 07:51  12/07/18 07:51  12/07/18 07:51








 Microbiology











 12/05/18 14:06 Gram Stain - Final





 Knee - Eswab 








 Laboratory Results





 12/07/18 05:32 





 12/06/18 03:20 





 











 12/06/18 12/07/18 12/08/18





 05:59 05:59 05:59


 


Intake Total 2075 1700 


 


Output Total 1375 1000 200


 


Balance 700 700 -200








RLE: incision dressing is clean and dry, NVI





ICD10 Worksheet


Patient Problems: 


 Problems











Problem Status Onset


 


Failed total right knee replacement Acute  


 


History of revision of total knee arthroplasty Acute  


 


Primary localized osteoarthritis of left knee Acute

## 2018-12-07 NOTE — PDIAF
- Diagnosis


Diagnosis: s/p Right knee revision TKA


Code Status: Full Code





- Medication Management


Discharge Medications: electronically signed and located in the Home Medication 

List.





- Orders


Diet Recommendation: no restrictions on diet


Diet Texture: Regular Texture Diet


Wound Care Instructions: see additional instructions


Additional Instructions: 


Joint Protocol-Knee Replacement


Follow up with Dr. Vasquez office as scheduled





After surgery instructions:





You received an adductor canal nerve block yesterday.  It alleviates pain for 

approximately 30 hours.  Once the numbness to your anterior shin wears off, 

your pain will increase.  Start taking narcotics even low dose narcotic pain 

meds as the numbness decreases


Take Aspirin 81mg by mouth morning and evening for 4 weeks (helps to prevent 

blood clots)


Wear thigh high JASPER hose on both legs during the daytime for 2 weeks (helps to 

prevent blood clots and decrease swelling in the surgical leg). It is ok to 

remove JASPER hose at night time to give your legs a break. 


It is common for swelling and bruising to occur in the entire surgical leg even 

extending to the foot, if concerned call Dr. Clifford office [phone]


Elevate the surgical leg with the ankle above the hip several times a day. ~

Ideally anytime you are resting throughout the day. Attempt to keep the knee 

straight while elevating by placing pillows under the ankle instead of the knee 

to elevate. This may be painful, so please do as much as tolerated. ~This will 

help you achieve full knee extension.


Use a walker for 21 days


Start outpatient physical therapy once out of SNF


Wear an ace wrap on the knee for 3-4 days after surgery, then it is no longer 

needed


Do exercises in the book 2-3 times a day


Ice at least 3-5 times a day for 30 minutes each time, if not more often. ~~We 

also recommend using the ice machine before falling asleep to help with pain


If you have further questions that are not addressed here, please look at the 

information packet handed to you at the preop appointment. ~Most will be 

answered on the FAQs, after surgery instructions and incision care pages.





*IF YOU HAVE A LIFE THREATENING EMERGENCY, CALL 911. FOR NON-LIFE THREATENING 

ISSUES, PLEASE CALL DR. VASQUEZ OFFICE FIRST. A PHYSICIAN IS ON CALL 24/7. 





Incision/Dressing Care:


May shower tomorrow, Incision dressing is waterproof.  Do not soak in water, 

but shower is ok.


Keep the incision (grey) dressing clean and dry. If the incision dressing gets 

soiled or wet underneath, change dressing to the dressing given to you by the 

hospital. (grey dressing will turn black if drainage occurs)


Remove incision dressing (grey one) two weeks after surgery. ~Leave steri 

strips alone. ~They will fall off on their own. 


Do not have anyone else remove the incision dressing prior to the stated 

recommendation (2 weeks after surgery). ~If there are incision concerns, 

contact Dr. Clifford office. ~(Dr. Mcdaniel may remove earlier if concerns arise)


If incision site (grey dressing) has drainage call Dr. Clifford office, [phone]. ~ Dr. Mcdaniel or his PAs may ask you to come into the office for 

further evaluation 





- Follow Up Care


Current Providers and Referrals: 


CHRIS Mcdaniel MD [Medical Doctor] - 


Camden Porras [Primary Care Provider] - 


Svetlana Mcdaniel PA [Physician Assistant] - 12/20/18 11:15 am

## 2018-12-07 NOTE — ASMTCMCOM
CM Note

 

CM Note                       

Notes:

Pt medically stable for d/c to [facility]. WC transport scheduled for 1500. Orders sent in 

[place]. HUDSON Castle to call report. 

 

Date Signed:  12/07/2018 03:21 PM

Electronically Signed By:GAYATRI Montelongo

## 2018-12-07 NOTE — ASDISCHSUM
----------------------------------------------

Discharge Information

----------------------------------------------

Plan Status:SNF                                      Medically Cleared to Leave:

Discharge Date:12/07/2018 03:35 PM                   CM D/C Disposition:

ADT D/C Disposition:Skilled Nursing Facility         Projected Discharge Date:12/06/2018 11:00 AM

Transportation at D/C:                               Discharge Delay Reason:

Follow-Up Date:12/06/2018 11:00 AM                   Discharge Slot:

Final Diagnosis:

----------------------------------------------

Placement Information

----------------------------------------------

Referral Type:*Nursing Home/SNF                      Referral ID:[id number]

Provider Name:Jyotsna Chaves

Address 1:[address]                         Phone Number:

Address 2:                                           Fax Number:

City:[place]                                       Selection Factors:

State:[place]

 

----------------------------------------------

Patient Contact Information

----------------------------------------------

Contact Name:ERASTEFAN                               Relationship:Son

Address:                                             Home [phone]

                                                     Work [phone]

City:                                                [facility] Phone:

[facility] Code:[place]                                    Email:

----------------------------------------------

Financial Information

----------------------------------------------

Financial Class:Medicare Advantage Plans

Primary Plan Desc:[employer]         Primary Plan Number:[id number]

Secondary Plan Desc:                                 Secondary Plan Number:

 

 

----------------------------------------------

Assessment Information

----------------------------------------------

----------------------------------------------

LACE

----------------------------------------------

LACE

 

Length of stay for            Answers:  3 days                                

current admission                                                             

Acuity / Level of             Answers:  Yes                                   

Care: Did the patient                                                         

have an inpatient                                                             

admission?                                                                    

Comorbidities - select        Answers:  Diabetes (uncontrolled or             

all that apply                          controlled)                           

                                        Opioid dependence                     

                                        / Chronic pain                        

                                        Other                         Notes:  HTN

# of Emergency department     Answers:  0                                     

visits in the last 6                                                          

months                                                                        

Social determinants           Answers:  Mental health diagnosis               

                                        (anxiety, depression, pers            

                                        onality disorders, etc.)              

Score: 15

 

Date Signed:  12/07/2018 03:20 PM

Electronically Signed By:GAYATRI Montelongo

 

 

----------------------------------------------

[facility] MARC Progress Note

----------------------------------------------

CM Note

 

CM Note                       

Notes:

Pt had planned TKA, resides alone. PT rec SNF. Pt accepted at [facility] and can accept pt 

today or tomorrow, pt prefers tomorrow. CM to follow.



D/c plan of care: [facility] 

 

Date Signed:  12/06/2018 01:52 PM

Electronically Signed By:GAYATRI Montelongo

 

 

----------------------------------------------

[facility] CM Progress Note

----------------------------------------------

CM Note

 

CM Note                       

Notes:

Pt medically stable for d/c to [facility]. WC transport scheduled for 1500. Orders sent in 

[place]. HUDSON Castle to call report. 

 

Date Signed:  12/07/2018 03:21 PM

Electronically Signed By:GAYATRI Montelongo

 

 

----------------------------------------------

Intervention Information

----------------------------------------------

Intervention Type:*IM-Signed                         Date of Service:12/07/2018 03:07 PM

Patient Type:Inpatient                               Staff Member:Leatha Benitez

Hours:                                               Discipline:

Severity:                                            Comment: